# Patient Record
Sex: FEMALE | Race: ASIAN | NOT HISPANIC OR LATINO | Employment: FULL TIME | URBAN - METROPOLITAN AREA
[De-identification: names, ages, dates, MRNs, and addresses within clinical notes are randomized per-mention and may not be internally consistent; named-entity substitution may affect disease eponyms.]

---

## 2023-09-30 PROBLEM — R74.8 ELEVATED LIVER ENZYMES: Status: ACTIVE | Noted: 2023-09-30

## 2023-09-30 RX ORDER — ALLOPURINOL 300 MG/1
TABLET ORAL
COMMUNITY
Start: 2015-07-14

## 2023-09-30 RX ORDER — ALPRAZOLAM 0.5 MG/1
TABLET ORAL
COMMUNITY
Start: 2016-11-19

## 2023-09-30 RX ORDER — OLMESARTAN MEDOXOMIL AND HYDROCHLOROTHIAZIDE 20/12.5 20; 12.5 MG/1; MG/1
TABLET ORAL
COMMUNITY
Start: 2016-06-30 | End: 2023-10-03

## 2023-09-30 RX ORDER — OMEPRAZOLE 40 MG/1
CAPSULE, DELAYED RELEASE ORAL
COMMUNITY
Start: 2016-05-09 | End: 2023-10-03

## 2023-09-30 RX ORDER — DICLOFENAC SODIUM 75 MG/1
TABLET, DELAYED RELEASE ORAL
COMMUNITY
Start: 2015-07-14 | End: 2023-10-03

## 2023-09-30 RX ORDER — ERGOCALCIFEROL 1.25 1/1
CAPSULE ORAL
COMMUNITY
Start: 2023-03-21 | End: 2023-10-03

## 2023-09-30 RX ORDER — CIPROFLOXACIN 500 MG/1
TABLET ORAL
COMMUNITY
Start: 2016-03-16 | End: 2023-10-03

## 2023-09-30 RX ORDER — ATENOLOL 100 MG/1
TABLET ORAL
COMMUNITY
Start: 2015-07-14

## 2023-09-30 RX ORDER — OLMESARTAN MEDOXOMIL 40 MG/1
TABLET ORAL
COMMUNITY
Start: 2023-01-12

## 2023-09-30 RX ORDER — PANTOPRAZOLE SODIUM 40 MG/1
1 TABLET, DELAYED RELEASE ORAL
COMMUNITY
Start: 2023-03-21

## 2023-10-03 ENCOUNTER — OFFICE VISIT (OUTPATIENT)
Dept: OTOLARYNGOLOGY | Facility: CLINIC | Age: 63
End: 2023-10-03
Payer: COMMERCIAL

## 2023-10-03 VITALS — BODY MASS INDEX: 22.36 KG/M2 | HEIGHT: 58 IN | WEIGHT: 106.5 LBS

## 2023-10-03 DIAGNOSIS — J34.89 NASAL CAVITY MASS: Primary | ICD-10-CM

## 2023-10-03 DIAGNOSIS — R04.0 EPISTAXIS: ICD-10-CM

## 2023-10-03 DIAGNOSIS — Z01.818 ENCOUNTER FOR PREADMISSION TESTING: ICD-10-CM

## 2023-10-03 PROCEDURE — 99204 OFFICE O/P NEW MOD 45 MIN: CPT | Performed by: OTOLARYNGOLOGY

## 2023-10-03 RX ORDER — ACETAMINOPHEN 500 MG
50000 TABLET ORAL
COMMUNITY

## 2023-10-03 RX ORDER — NAPROXEN SODIUM 220 MG/1
81 TABLET, FILM COATED ORAL 2 TIMES WEEKLY
COMMUNITY
Start: 2022-10-30

## 2023-10-03 ASSESSMENT — PATIENT HEALTH QUESTIONNAIRE - PHQ9
SUM OF ALL RESPONSES TO PHQ9 QUESTIONS 1 & 2: 0
1. LITTLE INTEREST OR PLEASURE IN DOING THINGS: NOT AT ALL
2. FEELING DOWN, DEPRESSED OR HOPELESS: NOT AT ALL

## 2023-10-03 NOTE — PROGRESS NOTES
Chief Complaint  Left nasal lesion, epistaxis    History Of Present Illness    Patient has anterior nasal drainage.   sometimes left sided.  Patient has  posterior nasal drainage.  with allergies.  Patient does not have nasal airway obstruction, but she can feel bump on the left when she breathes.  Patient does not have  facial pain.   Patient does not have  facial pressure.    Patient does not have decreased sense of smell.   Associated Symptoms:   Patient does not have  headaches.   Patient does not have throat clearing.   Patient does not have coughing.   Patient does not have dysphonia.   Patient has nasal bleeding.     Medications currently on for sinonasal symptoms: None  Medications tried in the past for sinonasal symptoms:  no    Other Pertinent Medical Conditions:   Patient does not have asthma.   Patient does not have aspirin sensitivity.   Patient does not have migraines.   Patient does not have history of allergy testing.   Has seasonal symptoms.  Patient does not have history of sinus surgery.   Patient does not have history of nasal fracture.   The patient has not had imaging of sinuses.     Reason for this visit:    Caren LovelaceMackKelly presents with concerns over a left anterior nasal cavity lesion.  She has seen something in the left side of her nose for the past 2 months and questions whether or not it is a small abscess or polyp.  With manipulation it will bleed.  Prior to 2 months ago she had no symptoms related to this and did not notice a lesion in her nose.  She denies any specific inciting event specifically trauma or infection.     Active Problems   Patient Active Problem List   Diagnosis    Elevated liver enzymes        Past Medical History  She has a past medical history of Gastro-esophageal reflux disease without esophagitis, Personal history of other diseases of the circulatory system, and Personal history of other diseases of the musculoskeletal system and connective  "tissue.    Surgical History  She has a past surgical history that includes Other surgical history (12/27/2016).     Family History  Family History   Problem Relation Name Age of Onset    Liver cancer Mother      Breast cancer Mother      Coronary artery disease Father      Other (Cardiac disorder) Father      Stroke Father      Heart attack Father         Social History  She reports that she has quit smoking. Her smoking use included cigarettes. She does not have any smokeless tobacco history on file. No history on file for alcohol use and drug use.     Allergies  Metronidazole and Sulfa (sulfonamide antibiotics)    Current Meds    Current Outpatient Medications:     aspirin 81 mg chewable tablet, 1 tablet (81 mg) 2 times a week., Disp: , Rfl:     allopurinol (Zyloprim) 300 mg tablet, Allopurinol 300 MG Oral Tablet  Quantity: 90  Refills: 0      Start : 14-Jul-2015  Active, Disp: , Rfl:     ALPRAZolam (Xanax) 0.5 mg tablet, ALPRAZolam 0.5 MG Oral Tablet  Quantity: 90  Refills: 0      Start : 19-Nov-2016  Active, Disp: , Rfl:     atenolol (Tenormin) 100 mg tablet, Atenolol 100 MG Oral Tablet  Quantity: 90  Refills: 0      Start : 14-Jul-2015  Active, Disp: , Rfl:     cholecalciferol (Vitamin D-3) 5,000 Units tablet, Take 10 tablets (50,000 Units) by mouth 1 (one) time per week., Disp: , Rfl:     olmesartan (BENIcar) 40 mg tablet, , Disp: , Rfl:     pantoprazole (ProtoNix) 40 mg EC tablet, , Disp: , Rfl:     Vitals  Visit Vitals  Ht 1.461 m (4' 9.5\")   Wt 48.3 kg (106 lb 8 oz)   BMI 22.65 kg/m²   Smoking Status Former   BSA 1.4 m²        Physical Exam  CONSTITUTIONAL: Vitals reviewed in nursing chart, well developed, well nourished.   RESPIRATION: Breathing comfortably, no stridor.  CV: No clubbing/cyanosis/edema in hands.  EYES: EOM Intact, sclera normal.  NEURO: Alert and oriented times 3, Cranial nerves 2-12 intact and symmetric bilaterally.  HEAD AND FACE: Skin with no masses or lesions, sinuses nontender to " palpation.  SALIVARY GLANDS: Parotid and submandibular glands normal bilaterally.  EARS: Normal external ears, external auditory canals, and TMs to otoscopy, normal hearing to whispered voice.  NOSE: External nose midline, anterior rhinoscopy on the right is normal.  Anterior rhinoscopy on the left demonstrated a vascular appearing lesion on the anterior floor of her nasal cavity adjacent to the septum.  Circumferentially her valve was not involved.  Posterior to this there were no concerning findings within her nasal cavity.  ORAL CAVITY/OROPHARYNX/LIPS: Normal mucous membranes, normal floor of mouth/tongue/OP, no masses or lesions are noted.  NECK/LYMPH: No LAD, no thyroid masses.    Provider Impressions   Left anterior nasal cavity lesion; epistaxis  Rhinorrhea  Allergy symptoms    Discussion:  Caren Boone and I discussed options.  For this type of lesion, I recommended resection in the operating room setting.  We discussed some clinic options but and lesions that are predisposed to bleeding this can cause issues in the clinic setting and definitive resection can be a challenge.  In the operating room setting this is much more achievable and she was comfortable with this concept.    We discussed risks including bleeding, infection, scarring.  It would be very unlikely that she would have any change to the external appearance of her nose.  Although she may have some scarring on the floor of her nasal cavity based on her clinical exam today I would not think that this would be externally evident.  We discussed crusting in the postoperative setting and that this persist for several weeks.  In some instances, I placed Frankel splints over the area of excision to facilitate healing but this is a case-by-case basis based on the ultimate size of the exposed area following resection.  I would favor benign pathology but we will certainly confirm this on final pathology.  She was amenable to moving forward with  this procedure and all questions were answered.    Patient Discussion/Summary  Welcome to Dr. Barak Christina's clinic. We are here to assist you with your ENT needs at [facility] [place]. Dr. Christina is an ENT that specializes in nose, sinus, and skull base disorders.    Dr. Barak Christina's office number is [phone]. Please call this number to contact his care team regardless of which office you use to access care. This number is the most direct way to communicate with all the members of the care team.    Whit Araujo CNP is a nurse practitioner who is a part of Dr. Christina's team. She will work collaboratively with Dr. Christina to meet your goals. This often may include seeing you for more urgent appointments or follow-up visits under Dr. Christina's supervision.    Karli Rodriguez RN BSN is Dr. Christina's primary nurse. She can be reached by calling [phone]. Karli is available during business hours Monday through Friday. Messages left for her will be returned within one business day. She is also Dr. Christina's surgery scheduler and will assist you with planning and scheduling of your surgery.     Monica Galvez is Dr. Christina's  and you can reach her at [phone]. She can help you with scheduling of appointments, general questions and information. She is available to receive calls Monday through Friday from 8:00 am until 4:25 pm.     For your convenience, Dr. Christina sees patients at [facility] and [facility] at [facility]. While we try to make your appointments as convenient as possible, occasionally a visit to another location may be necessary to provide the best care for you.    Dr. Christina makes every effort to run on time for your appointments. Therefore, if you are more than 25 minutes late, your appointment will need to be rescheduled to another day. We appreciate your understanding.     We look forward to working with you  to meet your healthcare goals.     Signature  Scribe Attestation  By signing my name below, I, Jasvir Ames   attest that this documentation has been prepared under the direction and in the presence of Barak Christina MD.

## 2023-10-03 NOTE — H&P (VIEW-ONLY)
Chief Complaint  Left nasal lesion, epistaxis    History Of Present Illness    Patient has anterior nasal drainage.   sometimes left sided.  Patient has  posterior nasal drainage.  with allergies.  Patient does not have nasal airway obstruction, but she can feel bump on the left when she breathes.  Patient does not have  facial pain.   Patient does not have  facial pressure.    Patient does not have decreased sense of smell.   Associated Symptoms:   Patient does not have  headaches.   Patient does not have throat clearing.   Patient does not have coughing.   Patient does not have dysphonia.   Patient has nasal bleeding.     Medications currently on for sinonasal symptoms: None  Medications tried in the past for sinonasal symptoms:  no    Other Pertinent Medical Conditions:   Patient does not have asthma.   Patient does not have aspirin sensitivity.   Patient does not have migraines.   Patient does not have history of allergy testing.   Has seasonal symptoms.  Patient does not have history of sinus surgery.   Patient does not have history of nasal fracture.   The patient has not had imaging of sinuses.     Reason for this visit:    Caren LovelaceMackKelly presents with concerns over a left anterior nasal cavity lesion.  She has seen something in the left side of her nose for the past 2 months and questions whether or not it is a small abscess or polyp.  With manipulation it will bleed.  Prior to 2 months ago she had no symptoms related to this and did not notice a lesion in her nose.  She denies any specific inciting event specifically trauma or infection.     Active Problems   Patient Active Problem List   Diagnosis    Elevated liver enzymes        Past Medical History  She has a past medical history of Gastro-esophageal reflux disease without esophagitis, Personal history of other diseases of the circulatory system, and Personal history of other diseases of the musculoskeletal system and connective  "tissue.    Surgical History  She has a past surgical history that includes Other surgical history (12/27/2016).     Family History  Family History   Problem Relation Name Age of Onset    Liver cancer Mother      Breast cancer Mother      Coronary artery disease Father      Other (Cardiac disorder) Father      Stroke Father      Heart attack Father         Social History  She reports that she has quit smoking. Her smoking use included cigarettes. She does not have any smokeless tobacco history on file. No history on file for alcohol use and drug use.     Allergies  Metronidazole and Sulfa (sulfonamide antibiotics)    Current Meds    Current Outpatient Medications:     aspirin 81 mg chewable tablet, 1 tablet (81 mg) 2 times a week., Disp: , Rfl:     allopurinol (Zyloprim) 300 mg tablet, Allopurinol 300 MG Oral Tablet  Quantity: 90  Refills: 0      Start : 14-Jul-2015  Active, Disp: , Rfl:     ALPRAZolam (Xanax) 0.5 mg tablet, ALPRAZolam 0.5 MG Oral Tablet  Quantity: 90  Refills: 0      Start : 19-Nov-2016  Active, Disp: , Rfl:     atenolol (Tenormin) 100 mg tablet, Atenolol 100 MG Oral Tablet  Quantity: 90  Refills: 0      Start : 14-Jul-2015  Active, Disp: , Rfl:     cholecalciferol (Vitamin D-3) 5,000 Units tablet, Take 10 tablets (50,000 Units) by mouth 1 (one) time per week., Disp: , Rfl:     olmesartan (BENIcar) 40 mg tablet, , Disp: , Rfl:     pantoprazole (ProtoNix) 40 mg EC tablet, , Disp: , Rfl:     Vitals  Visit Vitals  Ht 1.461 m (4' 9.5\")   Wt 48.3 kg (106 lb 8 oz)   BMI 22.65 kg/m²   Smoking Status Former   BSA 1.4 m²        Physical Exam  CONSTITUTIONAL: Vitals reviewed in nursing chart, well developed, well nourished.   RESPIRATION: Breathing comfortably, no stridor.  CV: No clubbing/cyanosis/edema in hands.  EYES: EOM Intact, sclera normal.  NEURO: Alert and oriented times 3, Cranial nerves 2-12 intact and symmetric bilaterally.  HEAD AND FACE: Skin with no masses or lesions, sinuses nontender to " palpation.  SALIVARY GLANDS: Parotid and submandibular glands normal bilaterally.  EARS: Normal external ears, external auditory canals, and TMs to otoscopy, normal hearing to whispered voice.  NOSE: External nose midline, anterior rhinoscopy on the right is normal.  Anterior rhinoscopy on the left demonstrated a vascular appearing lesion on the anterior floor of her nasal cavity adjacent to the septum.  Circumferentially her valve was not involved.  Posterior to this there were no concerning findings within her nasal cavity.  ORAL CAVITY/OROPHARYNX/LIPS: Normal mucous membranes, normal floor of mouth/tongue/OP, no masses or lesions are noted.  NECK/LYMPH: No LAD, no thyroid masses.    Provider Impressions   Left anterior nasal cavity lesion; epistaxis  Rhinorrhea  Allergy symptoms    Discussion:  Caren Boone and I discussed options.  For this type of lesion, I recommended resection in the operating room setting.  We discussed some clinic options but and lesions that are predisposed to bleeding this can cause issues in the clinic setting and definitive resection can be a challenge.  In the operating room setting this is much more achievable and she was comfortable with this concept.    We discussed risks including bleeding, infection, scarring.  It would be very unlikely that she would have any change to the external appearance of her nose.  Although she may have some scarring on the floor of her nasal cavity based on her clinical exam today I would not think that this would be externally evident.  We discussed crusting in the postoperative setting and that this persist for several weeks.  In some instances, I placed Frankel splints over the area of excision to facilitate healing but this is a case-by-case basis based on the ultimate size of the exposed area following resection.  I would favor benign pathology but we will certainly confirm this on final pathology.  She was amenable to moving forward with  this procedure and all questions were answered.    Patient Discussion/Summary  Welcome to Dr. Barak Christina's clinic. We are here to assist you with your ENT needs at [facility] [place]. Dr. Christina is an ENT that specializes in nose, sinus, and skull base disorders.    Dr. Barak Christina's office number is [phone]. Please call this number to contact his care team regardless of which office you use to access care. This number is the most direct way to communicate with all the members of the care team.    Whit Araujo CNP is a nurse practitioner who is a part of Dr. Christina's team. She will work collaboratively with Dr. Christina to meet your goals. This often may include seeing you for more urgent appointments or follow-up visits under Dr. Christina's supervision.    Karli Rodriguez RN BSN is Dr. Christina's primary nurse. She can be reached by calling [phone]. Karli is available during business hours Monday through Friday. Messages left for her will be returned within one business day. She is also Dr. Christina's surgery scheduler and will assist you with planning and scheduling of your surgery.     Monica Galvez is Dr. Christina's  and you can reach her at [phone]. She can help you with scheduling of appointments, general questions and information. She is available to receive calls Monday through Friday from 8:00 am until 4:25 pm.     For your convenience, Dr. Christina sees patients at [facility] and [facility]. While we try to make your appointments as convenient as possible, occasionally a visit to another location may be necessary to provide the best care for you.    Dr. Christina makes every effort to run on time for your appointments. Therefore, if you are more than 25 minutes late, your appointment will need to be rescheduled to another day. We appreciate your understanding.     We look forward to working with you  to meet your healthcare goals.     Signature  Scribe Attestation  By signing my name below, I, Jasvir Ames   attest that this documentation has been prepared under the direction and in the presence of Barak Christina MD.

## 2023-10-06 ENCOUNTER — APPOINTMENT (OUTPATIENT)
Dept: CARDIOLOGY | Facility: CLINIC | Age: 63
End: 2023-10-06
Payer: COMMERCIAL

## 2023-10-06 ENCOUNTER — LAB (OUTPATIENT)
Dept: LAB | Facility: LAB | Age: 63
End: 2023-10-06
Payer: COMMERCIAL

## 2023-10-06 DIAGNOSIS — Z01.818 ENCOUNTER FOR PREADMISSION TESTING: ICD-10-CM

## 2023-10-06 LAB
INR PPP: 0.9 (ref 0.9–1.1)
PROTHROMBIN TIME: 10.6 SECONDS (ref 9.8–12.8)

## 2023-10-06 PROCEDURE — 85610 PROTHROMBIN TIME: CPT

## 2023-10-06 PROCEDURE — 36415 COLL VENOUS BLD VENIPUNCTURE: CPT

## 2023-10-09 ENCOUNTER — HOSPITAL ENCOUNTER (OUTPATIENT)
Dept: CARDIOLOGY | Facility: CLINIC | Age: 63
Discharge: HOME | End: 2023-10-09
Payer: COMMERCIAL

## 2023-10-09 DIAGNOSIS — Z01.818 ENCOUNTER FOR PREADMISSION TESTING: ICD-10-CM

## 2023-10-09 PROCEDURE — 93005 ELECTROCARDIOGRAM TRACING: CPT

## 2023-10-09 PROCEDURE — 93010 ELECTROCARDIOGRAM REPORT: CPT | Performed by: INTERNAL MEDICINE

## 2023-10-18 ENCOUNTER — ANESTHESIA (OUTPATIENT)
Dept: OPERATING ROOM | Facility: CLINIC | Age: 63
End: 2023-10-18
Payer: COMMERCIAL

## 2023-10-18 ENCOUNTER — HOSPITAL ENCOUNTER (OUTPATIENT)
Facility: CLINIC | Age: 63
Setting detail: OUTPATIENT SURGERY
Discharge: HOME | End: 2023-10-18
Attending: OTOLARYNGOLOGY | Admitting: OTOLARYNGOLOGY
Payer: COMMERCIAL

## 2023-10-18 ENCOUNTER — ANESTHESIA EVENT (OUTPATIENT)
Dept: OPERATING ROOM | Facility: CLINIC | Age: 63
End: 2023-10-18
Payer: COMMERCIAL

## 2023-10-18 VITALS
DIASTOLIC BLOOD PRESSURE: 72 MMHG | BODY MASS INDEX: 23.92 KG/M2 | HEIGHT: 57 IN | OXYGEN SATURATION: 97 % | TEMPERATURE: 97.2 F | WEIGHT: 110.89 LBS | SYSTOLIC BLOOD PRESSURE: 153 MMHG | RESPIRATION RATE: 16 BRPM | HEART RATE: 58 BPM

## 2023-10-18 DIAGNOSIS — R04.0 EPISTAXIS: ICD-10-CM

## 2023-10-18 DIAGNOSIS — J34.89 NASAL CAVITY MASS: ICD-10-CM

## 2023-10-18 DIAGNOSIS — G89.18 POST-OPERATIVE PAIN: Primary | ICD-10-CM

## 2023-10-18 PROBLEM — I10 PRIMARY HYPERTENSION: Status: ACTIVE | Noted: 2023-10-18

## 2023-10-18 PROCEDURE — 2500000004 HC RX 250 GENERAL PHARMACY W/ HCPCS (ALT 636 FOR OP/ED)

## 2023-10-18 PROCEDURE — 30117 REMOVAL OF INTRANASAL LESION: CPT | Performed by: OTOLARYNGOLOGY

## 2023-10-18 PROCEDURE — 2500000005 HC RX 250 GENERAL PHARMACY W/O HCPCS

## 2023-10-18 PROCEDURE — 3700000001 HC GENERAL ANESTHESIA TIME - INITIAL BASE CHARGE: Performed by: OTOLARYNGOLOGY

## 2023-10-18 PROCEDURE — 88307 TISSUE EXAM BY PATHOLOGIST: CPT | Mod: TC,ELYLAB | Performed by: OTOLARYNGOLOGY

## 2023-10-18 PROCEDURE — 31231 NASAL ENDOSCOPY DX: CPT | Performed by: OTOLARYNGOLOGY

## 2023-10-18 PROCEDURE — 3600000009 HC OR TIME - EACH INCREMENTAL 1 MINUTE - PROCEDURE LEVEL FOUR: Performed by: OTOLARYNGOLOGY

## 2023-10-18 PROCEDURE — 7100000001 HC RECOVERY ROOM TIME - INITIAL BASE CHARGE: Performed by: OTOLARYNGOLOGY

## 2023-10-18 PROCEDURE — 3700000002 HC GENERAL ANESTHESIA TIME - EACH INCREMENTAL 1 MINUTE: Performed by: OTOLARYNGOLOGY

## 2023-10-18 PROCEDURE — 2500000005 HC RX 250 GENERAL PHARMACY W/O HCPCS: Performed by: OTOLARYNGOLOGY

## 2023-10-18 PROCEDURE — 2500000002 HC RX 250 W HCPCS SELF ADMINISTERED DRUGS (ALT 637 FOR MEDICARE OP, ALT 636 FOR OP/ED)

## 2023-10-18 PROCEDURE — A4217 STERILE WATER/SALINE, 500 ML: HCPCS | Performed by: OTOLARYNGOLOGY

## 2023-10-18 PROCEDURE — 3600000004 HC OR TIME - INITIAL BASE CHARGE - PROCEDURE LEVEL FOUR: Performed by: OTOLARYNGOLOGY

## 2023-10-18 PROCEDURE — A31276 PR NASAL/SINUS ENDOSCOPY,EXPLOR FRONTAL SINUS

## 2023-10-18 PROCEDURE — 2500000001 HC RX 250 WO HCPCS SELF ADMINISTERED DRUGS (ALT 637 FOR MEDICARE OP): Performed by: OTOLARYNGOLOGY

## 2023-10-18 PROCEDURE — 2500000001 HC RX 250 WO HCPCS SELF ADMINISTERED DRUGS (ALT 637 FOR MEDICARE OP)

## 2023-10-18 PROCEDURE — 2500000004 HC RX 250 GENERAL PHARMACY W/ HCPCS (ALT 636 FOR OP/ED): Performed by: OTOLARYNGOLOGY

## 2023-10-18 PROCEDURE — 7100000010 HC PHASE TWO TIME - EACH INCREMENTAL 1 MINUTE: Performed by: OTOLARYNGOLOGY

## 2023-10-18 PROCEDURE — 7100000002 HC RECOVERY ROOM TIME - EACH INCREMENTAL 1 MINUTE: Performed by: OTOLARYNGOLOGY

## 2023-10-18 PROCEDURE — 88307 TISSUE EXAM BY PATHOLOGIST: CPT | Mod: TC,SUR,ELYLAB | Performed by: OTOLARYNGOLOGY

## 2023-10-18 PROCEDURE — 88307 TISSUE EXAM BY PATHOLOGIST: CPT | Performed by: DENTIST

## 2023-10-18 PROCEDURE — A31276 PR NASAL/SINUS ENDOSCOPY,EXPLOR FRONTAL SINUS: Performed by: ANESTHESIOLOGY

## 2023-10-18 PROCEDURE — 7100000009 HC PHASE TWO TIME - INITIAL BASE CHARGE: Performed by: OTOLARYNGOLOGY

## 2023-10-18 RX ORDER — SODIUM CHLORIDE, SODIUM LACTATE, POTASSIUM CHLORIDE, CALCIUM CHLORIDE 600; 310; 30; 20 MG/100ML; MG/100ML; MG/100ML; MG/100ML
100 INJECTION, SOLUTION INTRAVENOUS CONTINUOUS
Status: DISCONTINUED | OUTPATIENT
Start: 2023-10-18 | End: 2023-10-18 | Stop reason: HOSPADM

## 2023-10-18 RX ORDER — SUCCINYLCHOLINE CHLORIDE 20 MG/ML
INJECTION INTRAMUSCULAR; INTRAVENOUS AS NEEDED
Status: DISCONTINUED | OUTPATIENT
Start: 2023-10-18 | End: 2023-10-18

## 2023-10-18 RX ORDER — SCOLOPAMINE TRANSDERMAL SYSTEM 1 MG/1
PATCH, EXTENDED RELEASE TRANSDERMAL AS NEEDED
Status: DISCONTINUED | OUTPATIENT
Start: 2023-10-18 | End: 2023-10-18

## 2023-10-18 RX ORDER — SODIUM CHLORIDE 0.9 G/100ML
IRRIGANT IRRIGATION AS NEEDED
Status: DISCONTINUED | OUTPATIENT
Start: 2023-10-18 | End: 2023-10-18 | Stop reason: HOSPADM

## 2023-10-18 RX ORDER — OXYMETAZOLINE HCL 0.05 %
SPRAY, NON-AEROSOL (ML) NASAL AS NEEDED
Status: DISCONTINUED | OUTPATIENT
Start: 2023-10-18 | End: 2023-10-18 | Stop reason: HOSPADM

## 2023-10-18 RX ORDER — PHENYLEPHRINE HCL IN 0.9% NACL 1 MG/10 ML
SYRINGE (ML) INTRAVENOUS AS NEEDED
Status: DISCONTINUED | OUTPATIENT
Start: 2023-10-18 | End: 2023-10-18

## 2023-10-18 RX ORDER — LIDOCAINE HYDROCHLORIDE 10 MG/ML
0.1 INJECTION, SOLUTION EPIDURAL; INFILTRATION; INTRACAUDAL; PERINEURAL ONCE
Status: DISCONTINUED | OUTPATIENT
Start: 2023-10-18 | End: 2023-10-18 | Stop reason: HOSPADM

## 2023-10-18 RX ORDER — TRAMADOL HYDROCHLORIDE 50 MG/1
50 TABLET ORAL EVERY 6 HOURS PRN
Qty: 15 TABLET | Refills: 0 | Status: SHIPPED | OUTPATIENT
Start: 2023-10-18

## 2023-10-18 RX ORDER — LIDOCAINE HYDROCHLORIDE AND EPINEPHRINE 10; 10 MG/ML; UG/ML
INJECTION, SOLUTION INFILTRATION; PERINEURAL AS NEEDED
Status: DISCONTINUED | OUTPATIENT
Start: 2023-10-18 | End: 2023-10-18 | Stop reason: HOSPADM

## 2023-10-18 RX ORDER — PROPOFOL 10 MG/ML
INJECTION, EMULSION INTRAVENOUS AS NEEDED
Status: DISCONTINUED | OUTPATIENT
Start: 2023-10-18 | End: 2023-10-18

## 2023-10-18 RX ORDER — ONDANSETRON HYDROCHLORIDE 2 MG/ML
4 INJECTION, SOLUTION INTRAVENOUS ONCE AS NEEDED
Status: DISCONTINUED | OUTPATIENT
Start: 2023-10-18 | End: 2023-10-18 | Stop reason: HOSPADM

## 2023-10-18 RX ORDER — CEFAZOLIN SODIUM 1 G/50ML
SOLUTION INTRAVENOUS AS NEEDED
Status: DISCONTINUED | OUTPATIENT
Start: 2023-10-18 | End: 2023-10-18

## 2023-10-18 RX ORDER — GABAPENTIN 300 MG/1
CAPSULE ORAL AS NEEDED
Status: DISCONTINUED | OUTPATIENT
Start: 2023-10-18 | End: 2023-10-18

## 2023-10-18 RX ORDER — MIDAZOLAM HYDROCHLORIDE 1 MG/ML
INJECTION, SOLUTION INTRAMUSCULAR; INTRAVENOUS AS NEEDED
Status: DISCONTINUED | OUTPATIENT
Start: 2023-10-18 | End: 2023-10-18

## 2023-10-18 RX ORDER — FENTANYL CITRATE 50 UG/ML
INJECTION, SOLUTION INTRAMUSCULAR; INTRAVENOUS AS NEEDED
Status: DISCONTINUED | OUTPATIENT
Start: 2023-10-18 | End: 2023-10-18

## 2023-10-18 RX ORDER — ACETAMINOPHEN 325 MG/1
TABLET ORAL AS NEEDED
Status: DISCONTINUED | OUTPATIENT
Start: 2023-10-18 | End: 2023-10-18

## 2023-10-18 RX ORDER — DEXAMETHASONE SODIUM PHOSPHATE 100 MG/10ML
INJECTION INTRAMUSCULAR; INTRAVENOUS AS NEEDED
Status: DISCONTINUED | OUTPATIENT
Start: 2023-10-18 | End: 2023-10-18

## 2023-10-18 RX ORDER — OXYCODONE HYDROCHLORIDE 5 MG/1
5 TABLET ORAL EVERY 4 HOURS PRN
Status: DISCONTINUED | OUTPATIENT
Start: 2023-10-18 | End: 2023-10-18 | Stop reason: HOSPADM

## 2023-10-18 RX ORDER — ALBUTEROL SULFATE 0.83 MG/ML
2.5 SOLUTION RESPIRATORY (INHALATION) ONCE AS NEEDED
Status: DISCONTINUED | OUTPATIENT
Start: 2023-10-18 | End: 2023-10-18 | Stop reason: HOSPADM

## 2023-10-18 RX ORDER — LIDOCAINE HYDROCHLORIDE 40 MG/ML
SOLUTION TOPICAL AS NEEDED
Status: DISCONTINUED | OUTPATIENT
Start: 2023-10-18 | End: 2023-10-18

## 2023-10-18 RX ORDER — CELECOXIB 200 MG/1
CAPSULE ORAL AS NEEDED
Status: DISCONTINUED | OUTPATIENT
Start: 2023-10-18 | End: 2023-10-18

## 2023-10-18 RX ORDER — APREPITANT 40 MG/1
CAPSULE ORAL AS NEEDED
Status: DISCONTINUED | OUTPATIENT
Start: 2023-10-18 | End: 2023-10-18

## 2023-10-18 RX ORDER — PROPOFOL 10 MG/ML
INJECTION, EMULSION INTRAVENOUS CONTINUOUS PRN
Status: DISCONTINUED | OUTPATIENT
Start: 2023-10-18 | End: 2023-10-18

## 2023-10-18 RX ORDER — LIDOCAINE HYDROCHLORIDE 20 MG/ML
INJECTION, SOLUTION INFILTRATION; PERINEURAL AS NEEDED
Status: DISCONTINUED | OUTPATIENT
Start: 2023-10-18 | End: 2023-10-18

## 2023-10-18 RX ORDER — ONDANSETRON HYDROCHLORIDE 2 MG/ML
INJECTION, SOLUTION INTRAVENOUS AS NEEDED
Status: DISCONTINUED | OUTPATIENT
Start: 2023-10-18 | End: 2023-10-18

## 2023-10-18 RX ADMIN — CEFAZOLIN SODIUM 2 G: 1 INJECTION, SOLUTION INTRAVENOUS at 10:05

## 2023-10-18 RX ADMIN — SCOPALAMINE 1 PATCH: 1 PATCH, EXTENDED RELEASE TRANSDERMAL at 09:30

## 2023-10-18 RX ADMIN — APREPITANT 40 MG: 40 CAPSULE ORAL at 09:30

## 2023-10-18 RX ADMIN — ACETAMINOPHEN 975 MG: 325 TABLET ORAL at 09:30

## 2023-10-18 RX ADMIN — PROPOFOL 60 MCG/KG/MIN: 10 INJECTION, EMULSION INTRAVENOUS at 10:04

## 2023-10-18 RX ADMIN — SODIUM CHLORIDE, POTASSIUM CHLORIDE, SODIUM LACTATE AND CALCIUM CHLORIDE: 600; 310; 30; 20 INJECTION, SOLUTION INTRAVENOUS at 09:50

## 2023-10-18 RX ADMIN — Medication 100 MCG: at 10:18

## 2023-10-18 RX ADMIN — SUCCINYLCHOLINE CHLORIDE 60 MG: 20 INJECTION, SOLUTION INTRAMUSCULAR; INTRAVENOUS at 09:58

## 2023-10-18 RX ADMIN — Medication 100 MCG: at 10:23

## 2023-10-18 RX ADMIN — Medication 100 MCG: at 10:30

## 2023-10-18 RX ADMIN — DEXAMETHASONE SODIUM PHOSPHATE 10 MG: 10 INJECTION INTRAMUSCULAR; INTRAVENOUS at 10:03

## 2023-10-18 RX ADMIN — GABAPENTIN 300 MG: 300 CAPSULE ORAL at 09:30

## 2023-10-18 RX ADMIN — CELECOXIB 200 MG: 200 CAPSULE ORAL at 09:30

## 2023-10-18 RX ADMIN — PROPOFOL 150 MG: 10 INJECTION, EMULSION INTRAVENOUS at 09:58

## 2023-10-18 RX ADMIN — MIDAZOLAM 2 MG: 1 INJECTION INTRAMUSCULAR; INTRAVENOUS at 09:50

## 2023-10-18 RX ADMIN — FENTANYL CITRATE 50 MCG: 0.05 INJECTION, SOLUTION INTRAMUSCULAR; INTRAVENOUS at 09:58

## 2023-10-18 RX ADMIN — LIDOCAINE HYDROCHLORIDE 60 MG: 20 INJECTION, SOLUTION INFILTRATION; PERINEURAL at 09:58

## 2023-10-18 RX ADMIN — ONDANSETRON 4 MG: 2 INJECTION, SOLUTION INTRAMUSCULAR; INTRAVENOUS at 10:25

## 2023-10-18 RX ADMIN — LIDOCAINE HYDROCHLORIDE 4 ML: 40 SOLUTION TOPICAL at 09:59

## 2023-10-18 SDOH — HEALTH STABILITY: MENTAL HEALTH: CURRENT SMOKER: 0

## 2023-10-18 ASSESSMENT — PAIN - FUNCTIONAL ASSESSMENT
PAIN_FUNCTIONAL_ASSESSMENT: 0-10

## 2023-10-18 ASSESSMENT — PAIN SCALES - GENERAL
PAINLEVEL_OUTOF10: 0 - NO PAIN

## 2023-10-18 ASSESSMENT — COLUMBIA-SUICIDE SEVERITY RATING SCALE - C-SSRS
2. HAVE YOU ACTUALLY HAD ANY THOUGHTS OF KILLING YOURSELF?: NO
6. HAVE YOU EVER DONE ANYTHING, STARTED TO DO ANYTHING, OR PREPARED TO DO ANYTHING TO END YOUR LIFE?: NO
1. IN THE PAST MONTH, HAVE YOU WISHED YOU WERE DEAD OR WISHED YOU COULD GO TO SLEEP AND NOT WAKE UP?: NO

## 2023-10-18 NOTE — ANESTHESIA POSTPROCEDURE EVALUATION
Patient: Caren Boone    Procedure Summary       Date: 10/18/23 Room / Location: [facility] OR 04 / Virtual [facility] OR    Anesthesia Start: 0950 Anesthesia Stop: 1053    Procedures:       Excision Lesion Nasal Cavity (Left)      Nasal Endoscopy (Bilateral) Diagnosis:       Nasal cavity mass      Epistaxis      (Nasal cavity mass [J34.89])      (Epistaxis [R04.0])    Surgeons: Barak Christina MD Responsible Provider: Tom Davison MD    Anesthesia Type: general ASA Status: 2            Anesthesia Type: general    Vitals Value Taken Time   /72 10/18/23 1115   Temp 36 °C (96.8 °F) 10/18/23 1051   Pulse 64 10/18/23 1115   Resp 16 10/18/23 1115   SpO2 100 % 10/18/23 1115       Anesthesia Post Evaluation    Patient location during evaluation: bedside  Patient participation: complete - patient participated  Level of consciousness: awake  Pain management: satisfactory to patient  Cardiovascular status: acceptable  Respiratory status: acceptable  Hydration status: acceptable  Comments: Did well        No notable events documented.

## 2023-10-18 NOTE — OP NOTE
Excision Lesion Nasal Cavity (L), Nasal Endoscopy (B) Operative Note     Date: 10/18/2023  OR Location: [facility]    Name: Caren Boone, : 1960, Age: 62 y.o., MRN: [id number], Sex: female    Diagnosis  Pre-op Diagnosis     * Nasal cavity mass [J34.89]     * Epistaxis [R04.0] Post-op Diagnosis     * Nasal cavity mass [J34.89]     * Epistaxis [R04.0]     Procedures  Excision Lesion Nasal Cavity  57750 - [place] EXCISION/DESTRUCTION INTRANASAL LESION INT APPR    Nasal Endoscopy  59988 - [place] NASAL ENDOSCOPY DIAGNOSTIC UNI/BI SPX    Surgeons      * Barak Christina - Primary    Resident/Fellow/Other Assistant:  No surgical staff documented.    Procedure Summary  Anesthesia: General  ASA: II  Anesthesia Staff: Anesthesiologist: Tom Davison MD  C-AA: SHAWN Julio  Estimated Blood Loss: 2mL  Intra-op Medications:   Medication Name Total Dose   sodium chloride 0.9 % irrigation solution 500 mL   lidocaine-epinephrine (Xylocaine W/EPI) 1 %-1:100,000 injection 6 mL   oxymetazoline (Afrin) 0.05 % nasal spray 3 spray              Anesthesia Record               Intraprocedure I/O Totals          Intake    Propofol Drip 0.00 mL    The total shown is the total volume documented since Anesthesia Start was filed.    Phenylephrine Drip 0.00 mL    The total shown is the total volume documented since Anesthesia Start was filed.    Total Intake 0 mL       Output    Est. Blood Loss 2 mL    Total Output 2 mL       Net    Net Volume -2 mL          Specimen:   ID Type Source Tests Collected by Time   1 : left anterior nasal cavity lesion Tissue NASAL MASS LEFT RESECTION SURGICAL PATHOLOGY EXAM Barak Christina MD 10/18/2023 1029        Staff:   Circulator: Agueda Pulido RN    Findings: Left anterior nasal cavity mass    Indications: Caren Boone presented to me in 2023 with a left anterior nasal cavity lesion.  She and I discussed options and I recommended excision.  We discussed  in office and operating room considerations and she was comfortable moving forward with resection in the operating room setting.  Risks and benefits were reviewed.    Procedure Details: After informed consent was obtained and all questions were answered the patient was brought to the operating room and placed supine on the operating room table.  Anesthesia was induced by the anesthesia staff and the patient was orally intubated.  The table was turned 90 degrees.  Afrin-soaked pledgets were placed within both the patient's nasal cavities.    Bilateral nasal endoscopy was performed.  Examination of the right nasal cavity revealed a normal middle meatus and sphenoethmoid recess without pus or polyps.  There were no nasal cavity lesions.  Examination of the left nasal cavity revealed an anterior inferior septal lesion extending to the base of the nasal cavity.  Posterior to this the middle meatus and sphenoethmoid recess were normal without pus or polyps.  1% lidocaine with 1-100,000 epinephrine was injected circumferentially around the lesion of concern.      Resection of left anterior nasal cavity lesion, internal approach, was performed.  After a period of approximately 10 minutes after injection of local using a needle tip Bovie incisions were made superiorly, posteriorly, and inferiorly around the lesion of concern to cartilage.  I used a crescent knife to make the anterior cut as it was at the mucocutaneous junction.  The lesion was then grossly resected from the septum.  The periphery was cauterized particularly inferiorly.  With gross resection completed hemostasis was achieved with an Afrin-soaked pledget.  The patient was turned over to the anesthesia staff and extubated in the operating room without complication.  She was transported the postanesthesia care unit in satisfactory condition.    Complications:  None; patient tolerated the procedure well.    Disposition: PACU - hemodynamically stable.  Condition:  stable   Pathology: Left anterior septal lesion for permanent    Attending Attestation: I performed the procedure.    Barak Christina  Phone Number: [phone]

## 2023-10-18 NOTE — DISCHARGE INSTRUCTIONS
Do not hesitate to call if you have any questions or concerns:  Offices of Otolaryngology - Division of Rhinology  Dr. Suárez [phone] - Dr. Christina [phone]   Normal office hours are:  8am-4pm Monday - Friday   If there is an after-hours EMERGENCY related to your procedure please call [phone] (ask for the “ENT resident on-call”).      BLEEDING: Some blood in the nasal drainage after surgery is expected. This may be red, dark red or brown. One way to monitor this is to tape a piece of gauze under the nose to collect the bloody drainage. This may saturate with blood every 1 to 2 hours for the first few days after surgery. If it saturates completely with blood (blood dripping off of it and totally red) MORE FREQUENTLY THAN EVERY 30 MINUTES then call our office. Avoid nose blowing and try to sneeze with your mouth open.  Sleeping with your head elevated for at least the first night will also help prevent bleeding and reduce congestion. If you have a nosebleed, try spraying a topical decongestant spray (see page 1) into the side of bleeding 2-3 sprays and hold gentle pressure for 10 minutes.  If the bleeding continues after this is done twice call our office.       Activities  · You may shower the same day as your surgery   · Avoid sneezing or blowing your nose for 2 weeks after surgery / if you do sneeze, do so with your mouth open  · Avoid strenuous activity for 2 weeks after surgery / do not lift heavy objects (greater than 10 pounds) for 1 week after surgery  · Walking is strongly encouraged after surgery   · Most patients return to work without about 5 to 7 days after surgery but this is variable  Diet  · You can resume a normal diet within 24 hours of the surgery    Fever  · A low-grade fever, less than 101° F is not uncommon for 2 to 3 days after surgery. If fever continues or is higher than 101° F, call your physician.  You can use Tylenol® to control the fever.    Do not drive, operate heavy  machinery or make any critical life decisions for 24 hours due to receiving anesthesia    You had tylenol ay 9:30am, you can have again at 3:30pm  You had Celebrex today, do not take NSAIDS until tomorrow    Scopalamine patch may stay on for 72 hours. Patch behind left ear  Remove patch, discard away from pets and children.  Do not touch eyes.  WASH HANDS THOROUGHLY!!!

## 2023-10-18 NOTE — ANESTHESIA PROCEDURE NOTES
Airway  Date/Time: 10/18/2023 9:59 AM  Urgency: elective    Airway not difficult    Staffing  Performed: SHAWN   Authorized by: Tom Davison MD    Performed by: SHAWN Julio  Patient location during procedure: OR    Indications and Patient Condition  Indications for airway management: anesthesia and airway protection  Spontaneous Ventilation: absent  Sedation level: deep  Preoxygenated: yes  Patient position: sniffing  Mask difficulty assessment: 1 - vent by mask  Planned trial extubation    Final Airway Details  Final airway type: endotracheal airway      Successful airway: ETT and POWER tube  Cuffed: yes   Successful intubation technique: direct laryngoscopy  Endotracheal tube insertion site: oral  Blade: Denice  Blade size: #3  ETT size (mm): 7.0  Cormack-Lehane Classification: grade I - full view of glottis  Placement verified by: chest auscultation   Measured from: teeth  ETT to teeth (cm): 21  Number of attempts at approach: 1    Additional Comments  Lips/teeth in preanesthetic condition.

## 2023-10-18 NOTE — ANESTHESIA PREPROCEDURE EVALUATION
Patient: Caren Boone    Procedure Information       Date/Time: 10/18/23 0915    Procedures:       Excision Lesion Nasal Cavity (Left)      Nasal Endoscopy (Bilateral)    [place] [facility] [place] [facility]    Surgeons: Barak Christina MD            Relevant Problems   Cardiovascular   (+) Primary hypertension      /Renal  L partial nephrectomy       Clinical information reviewed:   Tobacco  Allergies  Meds   Med Hx  Surg Hx   Fam Hx  Soc Hx        NPO Detail:  NPO/Void Status  Date of Last Liquid: 10/17/23  Time of Last Liquid: 2000  Date of Last Solid: 10/17/23  Time of Last Solid: 2000  Last Intake Type: Clear fluids; Food         Physical Exam    Airway  Mallampati: I  TM distance: >3 FB  Neck ROM: full     Cardiovascular - normal exam  Rhythm: regular  Rate: normal     Dental - normal exam     Pulmonary - normal exam     Abdominal - normal exam           Vitals:    10/18/23 0825   BP: 121/56   Pulse: 54   Resp: 16   Temp: 36 °C (96.8 °F)   SpO2: 97%        Anesthesia Plan    ASA 2     general     The patient is not a current smoker.  Patient was not previously instructed to abstain from smoking on day of procedure.  Patient did not smoke on day of procedure.    intravenous induction   Anesthetic plan and risks discussed with patient.  Use of blood products discussed with patient who.    Plan discussed with CAA and attending.

## 2023-10-19 ASSESSMENT — PAIN SCALES - GENERAL: PAINLEVEL_OUTOF10: 0 - NO PAIN

## 2023-10-20 LAB
ATRIAL RATE: 52 BPM
P AXIS: -1 DEGREES
P OFFSET: 203 MS
P ONSET: 154 MS
PR INTERVAL: 130 MS
Q ONSET: 219 MS
QRS COUNT: 9 BEATS
QRS DURATION: 86 MS
QT INTERVAL: 422 MS
QTC CALCULATION(BAZETT): 392 MS
QTC FREDERICIA: 402 MS
R AXIS: 42 DEGREES
T AXIS: 41 DEGREES
T OFFSET: 430 MS
VENTRICULAR RATE: 52 BPM

## 2023-11-01 ENCOUNTER — OFFICE VISIT (OUTPATIENT)
Dept: OTOLARYNGOLOGY | Facility: CLINIC | Age: 63
End: 2023-11-01
Payer: COMMERCIAL

## 2023-11-01 DIAGNOSIS — J34.89 NASAL CAVITY MASS: Primary | ICD-10-CM

## 2023-11-01 PROCEDURE — 99024 POSTOP FOLLOW-UP VISIT: CPT | Performed by: OTOLARYNGOLOGY

## 2023-11-01 PROCEDURE — 1036F TOBACCO NON-USER: CPT | Performed by: OTOLARYNGOLOGY

## 2023-11-01 NOTE — PROGRESS NOTES
Chief Complaint:  Left anterior nasal cavity lesion; epistaxis  Rhinorrhea  Allergy symptoms     History Of Present Illness:  Reason for this visit:  Caren Boone presents for her first postoperative evaluation since undergoing left-sided nasal cavity lesion excision on 10/18/23. Overall she's feeling well since the procedure.      Active Problems:  Patient Active Problem List   Diagnosis    Elevated liver enzymes    Nasal cavity mass    Epistaxis    Primary hypertension      Past Medical History:  She has a past medical history of Arthritis, Gastro-esophageal reflux disease without esophagitis, Hypertension, Personal history of other diseases of the circulatory system, Personal history of other diseases of the musculoskeletal system and connective tissue, Renal cancer (CMS/HCC), and Renal cancer, left (CMS/HCC).    Surgical History:  She has a past surgical history that includes Other surgical history (12/27/2016); Partial nephrectomy; and Replacement total hip lateral position.     Family History:  Family History   Problem Relation Name Age of Onset    Liver cancer Mother      Breast cancer Mother      Coronary artery disease Father      Other (Cardiac disorder) Father      Stroke Father      Heart attack Father       Social History:  She reports that she has quit smoking. Her smoking use included cigarettes. She has never used smokeless tobacco. She reports that she does not currently use alcohol. She reports that she does not use drugs.     Allergies:  Metronidazole, Metronidazole hcl, and Sulfa (sulfonamide antibiotics)    Current Meds:  Current Outpatient Medications:     allopurinol (Zyloprim) 300 mg tablet, Allopurinol 300 MG Oral Tablet  Quantity: 90  Refills: 0      Start : 14-Jul-2015  Active, Disp: , Rfl:     ALPRAZolam (Xanax) 0.5 mg tablet, ALPRAZolam 0.5 MG Oral Tablet  Quantity: 90  Refills: 0      Start : 19-Nov-2016  Active, Disp: , Rfl:     aspirin 81 mg chewable tablet, 1 tablet (81  mg) 2 times a week., Disp: , Rfl:     atenolol (Tenormin) 100 mg tablet, Atenolol 100 MG Oral Tablet  Quantity: 90  Refills: 0      Start : 14-Jul-2015  Active, Disp: , Rfl:     BIOTIN ORAL, Take 10,000 mcg by mouth once daily., Disp: , Rfl:     cholecalciferol (Vitamin D-3) 5,000 Units tablet, Take 10 tablets (50,000 Units) by mouth 1 (one) time per week., Disp: , Rfl:     olmesartan (BENIcar) 40 mg tablet, , Disp: , Rfl:     pantoprazole (ProtoNix) 40 mg EC tablet, Take 1 tablet (40 mg) by mouth once daily in the morning. Take before meals., Disp: , Rfl:     traMADol (Ultram) 50 mg tablet, Take 1 tablet (50 mg) by mouth every 6 hours if needed for severe pain (7 - 10)., Disp: 15 tablet, Rfl: 0    Vitals:  Visit Vitals  OB Status Postmenopausal   Smoking Status Former      Physical Exam:  Anterior rhinoscopy on the left demonstrated expected crusting over the excision site.  There was no septal perforation.    Provider Impressions:  Left anterior nasal cavity lesion with epistaxis s/p excision 10/18/23  Rhinorrhea  Allergy symptoms    Discussion:  Caren Boone appeared well on examination today.  In regard to the crusting that was noted on exam today, I left this in place as it was not obstructing her nasal cavity and I think it would be uncomfortable to remove and would simply recur.  Her pathology is still pending and I will reach out to pathology if I do not hear back from them within the next 1-2 weeks.  If she has not heard from me within the next 2 weeks I asked her to contact my office.  I asked her to follow-up routinely in about 3 to 4 months.  All questions were answered.    If the crusting on the left-hand side becomes an issue she can certainly use any number of saline products.    Patient Discussion/Summary:  Please followup with me in 3-4 months for reevaluation or sooner with any questions or concerns. Please feel free to contact my office by calling [phone] with any  questions.    Signature:  Scribe Attestation  By signing my name below, I, Jasvir Jefferson   attest that this documentation has been prepared under the direction and in the presence of Barak Christina MD.

## 2023-11-07 LAB
LABORATORY COMMENT REPORT: NORMAL
PATH REPORT.FINAL DX SPEC: NORMAL
PATH REPORT.GROSS SPEC: NORMAL
PATH REPORT.RELEVANT HX SPEC: NORMAL
PATH REPORT.TOTAL CANCER: NORMAL

## 2023-11-28 ENCOUNTER — TELEPHONE (OUTPATIENT)
Dept: OTOLARYNGOLOGY | Facility: CLINIC | Age: 63
End: 2023-11-28
Payer: COMMERCIAL

## 2023-11-28 NOTE — TELEPHONE ENCOUNTER
Patient pathology results listed below, communicated to patient per Dr. Chirstina. Patient has no additional questions at this time.            Component  Resulting Agency   FINAL DIAGNOSIS   Nasal cavity, left anterior, lesion, resection:  -- Lobular capillary hemangioma, ulcerated, see note.      Note: Sections show lobular capillary hemangioma involving squamous mucosa.  Detached fragment of fibrous tissue containing seromucinous glands with dense chronic inflammatory infiltrate and scarring is also noted.     at

## 2023-11-29 ENCOUNTER — APPOINTMENT (OUTPATIENT)
Dept: OTOLARYNGOLOGY | Facility: CLINIC | Age: 63
End: 2023-11-29
Payer: COMMERCIAL

## 2024-03-06 ENCOUNTER — OFFICE VISIT (OUTPATIENT)
Dept: OTOLARYNGOLOGY | Facility: CLINIC | Age: 64
End: 2024-03-06
Payer: COMMERCIAL

## 2024-03-06 VITALS — HEIGHT: 57 IN | BODY MASS INDEX: 22.39 KG/M2 | WEIGHT: 103.8 LBS

## 2024-03-06 DIAGNOSIS — I78.1 CAPILLARY HEMANGIOMA: Primary | ICD-10-CM

## 2024-03-06 PROCEDURE — 99213 OFFICE O/P EST LOW 20 MIN: CPT | Performed by: OTOLARYNGOLOGY

## 2024-03-06 PROCEDURE — 1036F TOBACCO NON-USER: CPT | Performed by: OTOLARYNGOLOGY

## 2024-03-06 ASSESSMENT — PAIN SCALES - GENERAL: PAINLEVEL: 0-NO PAIN

## 2024-03-06 NOTE — PROGRESS NOTES
Chief Complaint:  1.  Left anterior nasal cavity lesion with epistaxis s/p excision 10/18/23  2.  Rhinorrhea  3.  Allergy symptoms    History Of Present Illness:  Caren Boone presents since last being seen 11/1/23.    She has done well since her last assessment.  Her pathology returned as lobular capillary hemangioma.    She denies any recent epistaxis.     Active Problems:  Patient Active Problem List   Diagnosis    Elevated liver enzymes    Nasal cavity mass    Epistaxis    Primary hypertension      Past Medical History:  She has a past medical history of Arthritis, Gastro-esophageal reflux disease without esophagitis, Hypertension, Personal history of other diseases of the circulatory system, Personal history of other diseases of the musculoskeletal system and connective tissue, Renal cancer (CMS/HCC), and Renal cancer, left (CMS/HCC).    Surgical History:  She has a past surgical history that includes Other surgical history (12/27/2016); Partial nephrectomy; and Replacement total hip lateral position.     Family History:  Family History   Problem Relation Name Age of Onset    Liver cancer Mother      Breast cancer Mother      Coronary artery disease Father      Other (Cardiac disorder) Father      Stroke Father      Heart attack Father       Social History:  She reports that she has quit smoking. Her smoking use included cigarettes. She has never used smokeless tobacco. She reports that she does not currently use alcohol. She reports that she does not use drugs.     Allergies:  Metronidazole, Metronidazole hcl, and Sulfa (sulfonamide antibiotics)    Current Meds:  Current Outpatient Medications:     allopurinol (Zyloprim) 300 mg tablet, Allopurinol 300 MG Oral Tablet  Quantity: 90  Refills: 0      Start : 14-Jul-2015  Active, Disp: , Rfl:     ALPRAZolam (Xanax) 0.5 mg tablet, ALPRAZolam 0.5 MG Oral Tablet  Quantity: 90  Refills: 0      Start : 19-Nov-2016  Active, Disp: , Rfl:     aspirin 81 mg  chewable tablet, 1 tablet (81 mg) 2 times a week., Disp: , Rfl:     atenolol (Tenormin) 100 mg tablet, Atenolol 100 MG Oral Tablet  Quantity: 90  Refills: 0      Start : 14-Jul-2015  Active, Disp: , Rfl:     BIOTIN ORAL, Take 10,000 mcg by mouth once daily., Disp: , Rfl:     cholecalciferol (Vitamin D-3) 5,000 Units tablet, Take 10 tablets (50,000 Units) by mouth 1 (one) time per week., Disp: , Rfl:     olmesartan (BENIcar) 40 mg tablet, , Disp: , Rfl:     pantoprazole (ProtoNix) 40 mg EC tablet, Take 1 tablet (40 mg) by mouth once daily in the morning. Take before meals., Disp: , Rfl:     traMADol (Ultram) 50 mg tablet, Take 1 tablet (50 mg) by mouth every 6 hours if needed for severe pain (7 - 10)., Disp: 15 tablet, Rfl: 0    Vitals:  Visit Vitals  OB Status Postmenopausal   Smoking Status Former      Physical Exam:  Anterior rhinoscopy demonstrated no septal perforation or evidence of recurrent hemangioma.  Well-healed on the left.    Results/Data:  I personally reviewed the surgical pathology with the patient in clinic, it demonstrated that the left nasal cavity lesion was a lobular capillary hemangioma.     Provider Impressions:  1.  Left anterior nasal cavity lesion with epistaxis s/p excision 10/18/23  2.  Rhinorrhea  3.  Allergy symptoms    Discussion: Caren Boone and I discussed her pathology and exam.  She has healed very well.  I do not see evidence of persistence of the lesion.  I recommended follow-up with me as needed in regard to any further nasal or sinus concerns.  She was amenable to this and all questions were answered.    Signature:  Scribe Attestation  By signing my name below, I, Daniela Rain, Jasvir   attest that this documentation has been prepared under the direction and in the presence of Barak Christina MD.

## (undated) DEVICE — STAPLER, SKIN, PLUS, REGULAR, 35

## (undated) DEVICE — SYRINGE, 50 CC, LUER LOCK

## (undated) DEVICE — DRESSING, NON-ADHERENT, TELFA, OUCHLESS, 3 X 8 IN, STERILE

## (undated) DEVICE — STRIP, SKIN CLOSURE, STERI-STRIP, REINFORCED, 0.25 X 3 IN

## (undated) DEVICE — CATHETER, DRAINAGE, NASOGASTRIC, SUMP, SALEM, W/ANTI-REFLUX VALVE, 18 FR, 48 IN

## (undated) DEVICE — SUTURE, CHROMIC, 4-0, 18 IN, PS2, BROWN

## (undated) DEVICE — Device

## (undated) DEVICE — TRAP, SPECIMEN, NEPTUNE QUICK COLLECTOR

## (undated) DEVICE — SPONGE, GAUZE, XRAY DECT, 16 PLY, 4 X 4, W/MASTER DMT,STERILE

## (undated) DEVICE — SUCTION, COAGULATOR, 10FR

## (undated) DEVICE — ADHESIVE, SKIN, MASTISOL, 2/3 CC VIAL